# Patient Record
Sex: MALE | Race: WHITE | Employment: OTHER | URBAN - METROPOLITAN AREA
[De-identification: names, ages, dates, MRNs, and addresses within clinical notes are randomized per-mention and may not be internally consistent; named-entity substitution may affect disease eponyms.]

---

## 2019-08-26 ENCOUNTER — THERAPY VISIT (OUTPATIENT)
Dept: PHYSICAL THERAPY | Facility: CLINIC | Age: 69
End: 2019-08-26
Payer: COMMERCIAL

## 2019-08-26 DIAGNOSIS — M54.2 NECK PAIN: ICD-10-CM

## 2019-08-26 PROCEDURE — 97140 MANUAL THERAPY 1/> REGIONS: CPT | Mod: GP | Performed by: PHYSICAL THERAPIST

## 2019-08-26 PROCEDURE — 97161 PT EVAL LOW COMPLEX 20 MIN: CPT | Mod: GP | Performed by: PHYSICAL THERAPIST

## 2019-08-26 PROCEDURE — 97110 THERAPEUTIC EXERCISES: CPT | Mod: GP | Performed by: PHYSICAL THERAPIST

## 2019-08-26 NOTE — PROGRESS NOTES
[facility] Initial Evaluation  Subjective:  The history is provided by the patient.   Type of problem:  Cervical spine   Condition occurred with:  Insidious onset and degenerative joint disease. This is a recurrent condition   Problem details: Disc fusion over 10 years ago.  Pain started back up about 1 month ago.  Hands occasionally lose strength.  MD referral 8/9/2019..   Patient reports pain:  Central cervical spine, cervical right side, cervical left side, lower cervical spine and mid cervical spine. Radiates to:  Upper arm right, lower arm right, elbow right, hand right and shoulder right. Associated symptoms:  Loss of strength, loss of motion/stiffness, headache, numbness and tingling. Symptoms are exacerbated by rotating head, sitting and lying down (sitting in soft chair;  ) and relieved by activity/movement.      and reported as 4/10 on pain scale. General health as reported by patient is good. Pertinent medical history includes:  Chemical dependency, concussions/dizziness, high blood pressure, numbness/tingling, overweight, sleep disorder/apnea and smoking.    Surgeries include:  Orthopedic surgery (neck).  Current medications:  High blood pressure medication.     Pain is described as aching and is constant. Pain is worse in the P.M.. Since onset symptoms are unchanged.  Previous treatment includes chiropractic. There was mild improvement following previous treatment.   Occupation: Retired.   Barriers include:  None as reported by patient.  Red flags:  None as reported by patient.                      Objective:  Standing Alignment:    Cervical/Thoracic:  Normal  Shoulder/UE:  Normal                                  Cervical/Thoracic Evaluation    AROM:  AROM Cervical:    Flexion:          Normal   Extension:       Decreased 50%  Rotation:         Left: decreased 25%     Right: decreased 25%  Side Bend:      Left:     Right:       Headaches: none  Cervical Myotomes:   normal                        Cervical Palpation:    Tenderness present at Left:    Upper Trap; Levator and Erector Spinae  Tenderness present at Right:    Rhomboids; Upper Trap; Levator and Erector Spinae                                                  General     ROS    Assessment/Plan:    Patient is a 69 year old male with cervical complaints.    Patient has the following significant findings with corresponding treatment plan.                Diagnosis 1:  Cervical radic  Pain -  manual therapy, self management, education, directional preference exercise and home program  Decreased ROM/flexibility - manual therapy and therapeutic exercise  Decreased strength - therapeutic exercise and therapeutic activities  Impaired muscle performance - neuro re-education  Decreased function - therapeutic activities    Therapy Evaluation Codes:   1) History comprised of:   Personal factors that impact the plan of care:      None.    Comorbidity factors that impact the plan of care are:      Chemical Dependency, Concussion, High blood pressure, Numbness/tingling, Overweight, Sleep disorder/apnea and Smoking.     Medications impacting care: High blood pressure.  2) Examination of Body Systems comprised of:   Body structures and functions that impact the plan of care:      Cervical spine.   Activity limitations that impact the plan of care are:      Sitting and Laying down.  3) Clinical presentation characteristics are:   Stable/Uncomplicated.  4) Decision-Making    Low complexity using standardized patient assessment instrument and/or measureable assessment of functional outcome.  Cumulative Therapy Evaluation is: Low complexity.    Previous and current functional limitations:  (See Goal Flow Sheet for this information)    Short term and Long term goals: (See Goal Flow Sheet for this information)     Communication ability:  Patient appears to be able to clearly communicate and understand verbal and written communication and follow  directions correctly.  Treatment Explanation - The following has been discussed with the patient:   RX ordered/plan of care  Anticipated outcomes  Possible risks and side effects  This patient would benefit from PT intervention to resume normal activities.   Rehab potential is good.    Frequency:  1 X week, once daily  Duration:  for 6 weeks  Discharge Plan:  Achieve all LTG.  Independent in home treatment program.  Reach maximal therapeutic benefit.    Please refer to the daily flowsheet for treatment today, total treatment time and time spent performing 1:1 timed codes.

## 2019-08-26 NOTE — LETTER
[facility]  [address]  [facility] [address]  [phone]      Re: Carlton Larson   :   1950  MRN:  [id number]   REFERRING PHYSICIAN:   Guy Patel    [facility]  Date of Initial Evaluation:  19  Visits:  Rxs Used: 1  Reason for Referral:  Neck pain    EVALUATION SUMMARY    [facility] Initial Evaluation  Subjective:  The history is provided by the patient.   Type of problem:  Cervical spine   Condition occurred with:  Insidious onset and degenerative joint disease. This is a recurrent condition   Problem details: Disc fusion over 10 years ago.  Pain started back up about 1 month ago.  Hands occasionally lose strength.  MD referral 2019..   Patient reports pain:  Central cervical spine, cervical right side, cervical left side, lower cervical spine and mid cervical spine. Radiates to:  Upper arm right, lower arm right, elbow right, hand right and shoulder right. Associated symptoms:  Loss of strength, loss of motion/stiffness, headache, numbness and tingling. Symptoms are exacerbated by rotating head, sitting and lying down (sitting in soft chair;  ) and relieved by activity/movement.      and reported as 4/10 on pain scale. General health as reported by patient is good. Pertinent medical history includes:  Chemical dependency, concussions/dizziness, high blood pressure, numbness/tingling, overweight, sleep disorder/apnea and smoking.    Surgeries include:  Orthopedic surgery (neck).  Current medications:  High blood pressure medication.     Pain is described as aching and is constant. Pain is worse in the P.M.. Since onset symptoms are unchanged.  Previous treatment includes chiropractic. There was mild improvement following previous treatment.   Occupation: Retired.   Barriers include:  None as reported by patient.  Red flags:  None as reported by patient.    Objective:  Standing  Alignment:    Cervical/Thoracic:  Normal  Shoulder/UE:  Normal    Cervical/Thoracic Evaluation  AROM:  AROM Cervical:    Flexion:          Normal   Extension:       Decreased 50%  Rotation:         Left: decreased 25%     Right: decreased 25%  Headaches: none  Cervical Myotomes:  normal  Cervical Palpation:    Tenderness present at Left:    Upper Trap; Levator and Erector Spinae  Tenderness present at Right:    Rhomboids; Upper Trap; Levator and Erector Spinae    Re: Carlton Larson   :   1950  Assessment/Plan:    Patient is a 69 year old male with cervical complaints.    Patient has the following significant findings with corresponding treatment plan.                Diagnosis 1:  Cervical radic  Pain -  manual therapy, self management, education, directional preference exercise and home program  Decreased ROM/flexibility - manual therapy and therapeutic exercise  Decreased strength - therapeutic exercise and therapeutic activities  Impaired muscle performance - neuro re-education  Decreased function - therapeutic activities    Therapy Evaluation Codes:   1) History comprised of:   Personal factors that impact the plan of care:      None.    Comorbidity factors that impact the plan of care are:      Chemical Dependency, Concussion, High blood pressure, Numbness/tingling, Overweight, Sleep disorder/apnea and Smoking.     Medications impacting care: High blood pressure.  2) Examination of Body Systems comprised of:   Body structures and functions that impact the plan of care:      Cervical spine.   Activity limitations that impact the plan of care are:      Sitting and Laying down.  3) Clinical presentation characteristics are:   Stable/Uncomplicated.  4) Decision-Making    Low complexity using standardized patient assessment instrument and/or measureable assessment of functional outcome.  Cumulative Therapy Evaluation is: Low complexity.    Previous and current functional limitations:  (See Goal Flow Sheet for  this information)    Short term and Long term goals: (See Goal Flow Sheet for this information)     Communication ability:  Patient appears to be able to clearly communicate and understand verbal and written communication and follow directions correctly.  Treatment Explanation - The following has been discussed with the patient:   RX ordered/plan of care  Anticipated outcomes  Possible risks and side effects  This patient would benefit from PT intervention to resume normal activities.   Rehab potential is good.    Frequency:  1 X week, once daily  Duration:  for 6 weeks  Discharge Plan:  Achieve all LTG.  Independent in home treatment program.  Reach maximal therapeutic benefit.    Thank you for your referral.  INQUIRIES  Therapist: Shakeel Machado, PT  [facility] [address] [address]  Phone: [phone]  Fax: [phone]

## 2019-09-04 ENCOUNTER — THERAPY VISIT (OUTPATIENT)
Dept: PHYSICAL THERAPY | Facility: CLINIC | Age: 69
End: 2019-09-04
Payer: COMMERCIAL

## 2019-09-04 DIAGNOSIS — M54.2 NECK PAIN: ICD-10-CM

## 2019-09-04 PROCEDURE — 97112 NEUROMUSCULAR REEDUCATION: CPT | Mod: GP | Performed by: PHYSICAL THERAPIST

## 2019-09-04 PROCEDURE — 97110 THERAPEUTIC EXERCISES: CPT | Mod: GP | Performed by: PHYSICAL THERAPIST

## 2019-09-04 PROCEDURE — 97140 MANUAL THERAPY 1/> REGIONS: CPT | Mod: GP | Performed by: PHYSICAL THERAPIST

## 2019-09-10 ENCOUNTER — THERAPY VISIT (OUTPATIENT)
Dept: PHYSICAL THERAPY | Facility: CLINIC | Age: 69
End: 2019-09-10
Payer: COMMERCIAL

## 2019-09-10 DIAGNOSIS — M54.2 NECK PAIN: ICD-10-CM

## 2019-09-10 PROCEDURE — 97140 MANUAL THERAPY 1/> REGIONS: CPT | Mod: GP | Performed by: PHYSICAL THERAPIST

## 2019-09-10 PROCEDURE — 97110 THERAPEUTIC EXERCISES: CPT | Mod: GP | Performed by: PHYSICAL THERAPIST

## 2019-09-10 PROCEDURE — 97112 NEUROMUSCULAR REEDUCATION: CPT | Mod: GP | Performed by: PHYSICAL THERAPIST

## 2019-09-17 ENCOUNTER — THERAPY VISIT (OUTPATIENT)
Dept: PHYSICAL THERAPY | Facility: CLINIC | Age: 69
End: 2019-09-17
Payer: COMMERCIAL

## 2019-09-17 DIAGNOSIS — M54.2 NECK PAIN: ICD-10-CM

## 2019-09-17 PROCEDURE — 97140 MANUAL THERAPY 1/> REGIONS: CPT | Mod: GP | Performed by: PHYSICAL THERAPIST

## 2019-09-17 PROCEDURE — 97110 THERAPEUTIC EXERCISES: CPT | Mod: GP | Performed by: PHYSICAL THERAPIST

## 2019-09-17 PROCEDURE — 97112 NEUROMUSCULAR REEDUCATION: CPT | Mod: GP | Performed by: PHYSICAL THERAPIST

## 2019-10-03 ENCOUNTER — THERAPY VISIT (OUTPATIENT)
Dept: PHYSICAL THERAPY | Facility: CLINIC | Age: 69
End: 2019-10-03
Payer: COMMERCIAL

## 2019-10-03 DIAGNOSIS — M54.2 NECK PAIN: ICD-10-CM

## 2019-10-03 PROCEDURE — 97110 THERAPEUTIC EXERCISES: CPT | Mod: GP | Performed by: PHYSICAL THERAPIST

## 2019-10-03 PROCEDURE — 97112 NEUROMUSCULAR REEDUCATION: CPT | Mod: GP | Performed by: PHYSICAL THERAPIST

## 2019-10-03 PROCEDURE — 97140 MANUAL THERAPY 1/> REGIONS: CPT | Mod: GP | Performed by: PHYSICAL THERAPIST

## 2019-10-03 NOTE — PROGRESS NOTES
Subjective:  HPI                    Objective:  System    Physical Exam    General     ROS    Assessment/Plan:    DISCHARGE REPORT    Progress reporting period is from 8/26/2019 to 10/3/2019.       SUBJECTIVE  Subjective changes noted by patient:  One bad day since last visit.      Current pain level is 1/10.     Previous pain level was   4/10.   Changes in function:  Yes (See Goal flowsheet attached for changes in current functional level)  Adverse reaction to treatment or activity: None    OBJECTIVE  Objective: Correctly demo all exercises.    Normal motion     ASSESSMENT/PLAN  Updated problem list and treatment plan: Diagnosis 1:  Cervical radic  Pain -  manual therapy, self management, education, directional preference exercise and home program  Decreased ROM/flexibility - manual therapy and therapeutic exercise  Decreased strength - therapeutic exercise and therapeutic activities  Impaired muscle performance - neuro re-education  Decreased function - therapeutic activities     STG/LTGs have been met or progress has been made towards goals:  Yes (See Goal flow sheet completed today.)  Assessment of Progress: The patient's condition is improving.  The patient has met all of their long term goals.  Self Management Plans:  Patient has been instructed in a home treatment program.    Carlton continues to require the following intervention to meet STG and LTG's:  PT intervention is no longer required to meet STG/LTG.    Recommendations:  This patient is ready to be discharged from therapy and continue their home treatment program.    Please refer to the daily flowsheet for treatment today, total treatment time and time spent performing 1:1 timed codes.

## 2019-10-03 NOTE — LETTER
[facility] [address] [address]  [phone]    Re: Carlton Larson   :   1950  MRN:  [id number]   REFERRING PHYSICIAN:   Guy Patel    [facility]  Date of Initial Evaluation:  10/3/19  Visits:  Rxs Used: 5  Reason for Referral:  Neck pain    DISCHARGE REPORT  Progress reporting period is from 2019 to 10/3/2019.       SUBJECTIVE  Subjective changes noted by patient:  One bad day since last visit.      Current pain level is 1/10.     Previous pain level was   4/10.   Changes in function:  Yes (See Goal flowsheet attached for changes in current functional level)  Adverse reaction to treatment or activity: None    OBJECTIVE  Objective: Correctly demo all exercises.    Normal motion     ASSESSMENT/PLAN  Updated problem list and treatment plan: Diagnosis 1:  Cervical radic  Pain -  manual therapy, self management, education, directional preference exercise and home program  Decreased ROM/flexibility - manual therapy and therapeutic exercise  Decreased strength - therapeutic exercise and therapeutic activities  Impaired muscle performance - neuro re-education  Decreased function - therapeutic activities     STG/LTGs have been met or progress has been made towards goals:  Yes (See Goal flow sheet completed today.)  Assessment of Progress: The patient's condition is improving.  The patient has met all of their long term goals.  Self Management Plans:  Patient has been instructed in a home treatment program.    Carlton continues to require the following intervention to meet STG and LTG's:  PT intervention is no longer required to meet STG/LTG.    Recommendations:  This patient is ready to be discharged from therapy and continue their home treatment program.      Thank you for your referral.    INQUIRIES  Therapist: Shakeel Machado PT  [facility] [address]  [facility] [address]  Phone: [phone]  Fax: [phone]

## 2022-02-28 ENCOUNTER — TRANSCRIBE ORDERS (OUTPATIENT)
Dept: OTHER | Age: 72
End: 2022-02-28

## 2022-02-28 DIAGNOSIS — M51.369 DDD (DEGENERATIVE DISC DISEASE), LUMBAR: ICD-10-CM

## 2022-02-28 DIAGNOSIS — M54.41 BILATERAL LOW BACK PAIN WITH RIGHT-SIDED SCIATICA, UNSPECIFIED CHRONICITY: Primary | ICD-10-CM

## 2022-03-09 ENCOUNTER — THERAPY VISIT (OUTPATIENT)
Dept: PHYSICAL THERAPY | Facility: CLINIC | Age: 72
End: 2022-03-09
Attending: FAMILY MEDICINE
Payer: COMMERCIAL

## 2022-03-09 DIAGNOSIS — G89.29 CHRONIC BILATERAL LOW BACK PAIN WITHOUT SCIATICA: ICD-10-CM

## 2022-03-09 DIAGNOSIS — M54.50 CHRONIC BILATERAL LOW BACK PAIN WITHOUT SCIATICA: ICD-10-CM

## 2022-03-09 PROCEDURE — 97161 PT EVAL LOW COMPLEX 20 MIN: CPT | Mod: GP | Performed by: PHYSICAL THERAPIST

## 2022-03-09 PROCEDURE — 97140 MANUAL THERAPY 1/> REGIONS: CPT | Mod: GP | Performed by: PHYSICAL THERAPIST

## 2022-03-09 PROCEDURE — 97110 THERAPEUTIC EXERCISES: CPT | Mod: GP | Performed by: PHYSICAL THERAPIST

## 2022-03-09 NOTE — PROGRESS NOTES
Physical Therapy Initial Evaluation  Subjective:  The history is provided by the patient.   Therapist Generated HPI Evaluation  Problem details: MD referral 2/28/2022.  R > L LBP.  Original injury in 1973.  Will get an occasional flare up that resolves.  Most recently pain increased after a chiropractor adjustment.  .         Type of problem:  Lumbar.    This is a chronic condition.  Condition occurred with:  Insidious onset.    Patient reports pain:  Lower lumbar spine, central lumbar spine, lumbar spine right and lumbar spine left.  Pain is described as aching and is constant.  Pain radiates to:  No radiation. Pain is the same all the time and worse in the A.M..  Since onset symptoms are gradually improving.  Associated symptoms:  Loss of motion/stiffness. Symptoms are exacerbated by sitting (sit to stand;  Sitting x 5 min )  Relieved by: good sitting posture.  Special tests included:  X-ray (degen per pt).  Previous treatment includes chiropractic.       Patient Health History  Carlton Larson being seen for LB.     Date of Onset: since 1973 on and off.   Problem occurred: fell off ladder to floor     General health as reported by patient is good.  Pertinent medical history includes: concussions/dizziness, high blood pressure, overweight and sleep disorder/apnea.   Red flags:  None as reported by patient.  Medical allergies: other. Other medical allergies details: flomax.   Surgeries include:  Orthopedic surgery. Other surgery history details: foot;  disc removal and C4-5 fusion.    Current medications:  High blood pressure medication and pain medication.    Current occupation is retired.      Other job/home tasks details: house and yard work.                                  Objective:  Standing Alignment:        Lumbar:  Normal            Gait:    Gait Type:  Normal                    Lumbar/SI Evaluation  ROM:    AROM Lumbar:   Flexion:          Normal  Ext:                    Normal with pain   Side Bend:         Left:  Decreased 25%    Right:  Decreased 25%  Rotation:           Left:     Right:   Side Glide:        Left:     Right:           Lumbar Myotomes:  normal            Lumbar DTR's:  normal          Neural Tension/Mobility:      Left side:SLR or Slump  negative.     Right side:   Slump or SLR  negative.   Lumbar Palpation:      Tenderness not present at Left:    Quadratus Lumborum; Erector Spinae or Piriformis  Tenderness present at Right: Quadratus Lumborum and Erector Spinae  Tenderness not present at Right:  Piriformis      Spinal Segmental Conclusions: Pain at L2-L3 with testing    Level: Hypo noted at L1, L2, L3, L4 and L5      SI joint/Sacrum:    Negative Fabers                                                        General     ROS    Assessment/Plan:    Patient is a 71 year old male with lumbar complaints.    Patient has the following significant findings with corresponding treatment plan.                Diagnosis 1:  LBP  Pain -  manual therapy, self management, education, directional preference exercise and home program  Decreased ROM/flexibility - manual therapy and therapeutic exercise  Decreased strength - therapeutic exercise and therapeutic activities  Impaired muscle performance - neuro re-education  Decreased function - therapeutic activities    Therapy Evaluation Codes:   1) History comprised of:   Personal factors that impact the plan of care:      None.    Comorbidity factors that impact the plan of care are:      Dizziness, High blood pressure, Overweight and Sleep disorder/apnea.     Medications impacting care: High blood pressure and Pain.  2) Examination of Body Systems comprised of:   Body structures and functions that impact the plan of care:      Lumbar spine.   Activity limitations that impact the plan of care are:      Sitting.  3) Clinical presentation characteristics are:   Stable/Uncomplicated.  4) Decision-Making    Low complexity using standardized patient assessment instrument  and/or measureable assessment of functional outcome.  Cumulative Therapy Evaluation is: Low complexity.    Previous and current functional limitations:  (See Goal Flow Sheet for this information)    Short term and Long term goals: (See Goal Flow Sheet for this information)     Communication ability:  Patient appears to be able to clearly communicate and understand verbal and written communication and follow directions correctly.  Treatment Explanation - The following has been discussed with the patient:   RX ordered/plan of care  Anticipated outcomes  Possible risks and side effects  This patient would benefit from PT intervention to resume normal activities.   Rehab potential is good.    Frequency:  2 X a month, once daily  Duration:  for 3 months  Discharge Plan:  Achieve all LTG.  Independent in home treatment program.  Reach maximal therapeutic benefit.    Please refer to the daily flowsheet for treatment today, total treatment time and time spent performing 1:1 timed codes.

## 2022-03-09 NOTE — PROGRESS NOTES
[facility]    OUTPATIENT Physical Therapy ORTHOPEDIC EVALUATION  PLAN OF TREATMENT FOR OUTPATIENT REHABILITATION  (COMPLETE FOR INITIAL CLAIMS ONLY)  Patient's Last Name, First Name, M.I.  YOB: 1950  Carlton Larson    Provider s Name:  [facility]   Medical Record No.  [id number]   Start of Care Date:  03/09/22   Onset Date:   02/28/22 (MD referral )   Type:     _X__PT   ___OT Medical Diagnosis:    Encounter Diagnosis   Name Primary?     Chronic bilateral low back pain without sciatica         Treatment Diagnosis:  LBP        Goals:     03/09/22 0500   Body Part   Goals listed below are for LBP   Goal #1   Goal #1 sitting   Previous Functional Level No restrictions   Current Functional Level Minutes patient can sit   Performance level 5 prior to pain   STG Target Performance Minutes patient will be able to sit   Performance level 15 prior to pain   Rationale for personal hygiene;to allow rest from standing;for community transportation   Due date 04/20/22   LTG Target Performance Minutes patient will be able to sit   Performance Level 30 prior to pain   Rationale for community transportation   Due date 06/01/22       Therapy Frequency:  2x/ month  Predicted Duration of Therapy Intervention:  3 months    Shakeel Machado, PT                 I CERTIFY THE NEED FOR THESE SERVICES FURNISHED UNDER        THIS PLAN OF TREATMENT AND WHILE UNDER MY CARE .             Physician Signature               Date    X_____________________________________________________                             Certification Date From:  03/09/22   Certification Date To:  06/06/22    Referring Provider:  Guy Patel    Initial Assessment        See Epic Evaluation SOC Date: 03/09/22

## 2022-03-22 ENCOUNTER — THERAPY VISIT (OUTPATIENT)
Dept: PHYSICAL THERAPY | Facility: CLINIC | Age: 72
End: 2022-03-22
Payer: COMMERCIAL

## 2022-03-22 DIAGNOSIS — G89.29 CHRONIC BILATERAL LOW BACK PAIN WITHOUT SCIATICA: Primary | ICD-10-CM

## 2022-03-22 DIAGNOSIS — M54.50 CHRONIC BILATERAL LOW BACK PAIN WITHOUT SCIATICA: Primary | ICD-10-CM

## 2022-03-22 PROCEDURE — 97530 THERAPEUTIC ACTIVITIES: CPT | Mod: GP | Performed by: PHYSICAL THERAPIST

## 2022-03-22 PROCEDURE — 97110 THERAPEUTIC EXERCISES: CPT | Mod: GP | Performed by: PHYSICAL THERAPIST

## 2022-03-28 ENCOUNTER — THERAPY VISIT (OUTPATIENT)
Dept: PHYSICAL THERAPY | Facility: CLINIC | Age: 72
End: 2022-03-28
Payer: COMMERCIAL

## 2022-03-28 DIAGNOSIS — G89.29 CHRONIC BILATERAL LOW BACK PAIN WITHOUT SCIATICA: Primary | ICD-10-CM

## 2022-03-28 DIAGNOSIS — M54.50 CHRONIC BILATERAL LOW BACK PAIN WITHOUT SCIATICA: Primary | ICD-10-CM

## 2022-03-28 PROCEDURE — 97140 MANUAL THERAPY 1/> REGIONS: CPT | Mod: GP | Performed by: PHYSICAL THERAPIST

## 2022-03-28 PROCEDURE — 97110 THERAPEUTIC EXERCISES: CPT | Mod: GP | Performed by: PHYSICAL THERAPIST
